# Patient Record
Sex: FEMALE | Race: WHITE
[De-identification: names, ages, dates, MRNs, and addresses within clinical notes are randomized per-mention and may not be internally consistent; named-entity substitution may affect disease eponyms.]

---

## 2019-08-01 ENCOUNTER — HOSPITAL ENCOUNTER (OUTPATIENT)
Dept: HOSPITAL 47 - SLEEP | Age: 64
End: 2019-08-01
Attending: INTERNAL MEDICINE
Payer: MEDICARE

## 2019-08-01 DIAGNOSIS — Z79.899: ICD-10-CM

## 2019-08-01 DIAGNOSIS — E11.9: ICD-10-CM

## 2019-08-01 DIAGNOSIS — E66.9: ICD-10-CM

## 2019-08-01 DIAGNOSIS — M19.90: ICD-10-CM

## 2019-08-01 DIAGNOSIS — K75.4: ICD-10-CM

## 2019-08-01 DIAGNOSIS — G47.33: Primary | ICD-10-CM

## 2019-08-01 DIAGNOSIS — I10: ICD-10-CM

## 2019-08-01 DIAGNOSIS — J45.909: ICD-10-CM

## 2019-08-01 DIAGNOSIS — E03.9: ICD-10-CM

## 2019-08-01 PROCEDURE — 99211 OFF/OP EST MAY X REQ PHY/QHP: CPT

## 2019-08-01 NOTE — CONS
CONSULTATION



DATE OF SERVICE:

08/01/2019



This patient is a 64-year-old lady who has been evaluated in the sleep center for

obstructive sleep apnea-hypopnea syndrome.



HISTORY OF PRESENT ILLNESS/SLEEP-WAKE EVALUATION:

The patient was diagnosed with sleep apnea about 18 years ago, was on treatment with

CPAP but had some problem with her CPAP unit and has not been able to use it recently.



Her usual sleep schedule is from around 12 midnight until between 4:30 and 6:30 a.m.

She does have problems with falling asleep, although no TV in bedroom.  She sleeps on

the side position. She snores, has episodes of stopped breathing during sleep, wakes up

from sleep several times with up to 2 episodes of nocturia.  No history of hypnagogic

hallucinations, sleep paralysis or cataplexy.  [place] Sleepiness Scale is

significantly increased at 13.



PAST MEDICAL HISTORY:

1. Hypertension.

2. Asthma.

3. Diabetes.

4. Arthritis.

5. Autoimmune hepatitis.



MEDICATIONS:

1. Synthroid.

2. Glucophage.

3. Actos.

4. Neurontin.

5. Albuterol on p.r.n. basis.

6. Azathioprine.

7. Some other medications; patient does not remember the names.



SOCIAL HISTORY:

Negative for smoking or using alcohol.



FAMILY HISTORY:

Hypertension, hyperlipidemia, arthritis, asthma, bronchitis, sleep apnea, thyroid

problems, diabetes, liver problem, acid reflux.



REVIEW OF SYSTEMS:

Multiple awakenings from sleep, snoring, tiredness and sleepiness during the day.



PHYSICAL EXAMINATION:

GENERAL: A pleasant  lady without distress.

VITAL SIGNS: /98, HR 78, RR 16, height 5 feet 0 inches, weight 222.  Body mass

index 43.3. Temperature 98.4, oxygen saturation on room air 98%.

HEENT: PERRLA, EOMI. Evaluation of oropharynx showed tongue protrudes midline.

Extremely low position of soft palate. Mallampati IV. Some restriction of nasal

breathing bilaterally.

NECK: Supple. No JVD.  Thyroid is not palpable. Wide neck; 17-1/2 inches in

circumference.

LUNGS: Clear to percussion and to auscultation.  Good air exchange.  No wheezing or

rhonchi.

HEART: S1, S2 regular.  No murmurs, gallops or rubs.

ABDOMEN: Obese.

EXTREMITIES:  One plus bilateral ankle edema.

CNS: Awake, alert, and oriented X3.  Cranial nerves 2 to 7 intact.  There is no

fasciculation or atrophy. noted.  No focal deficits observed.



IMPRESSION:

1. Snoring, witnessed episodes of stopped breathing during sleep, waking from sleep

    with choking, extremely low position of soft palate, wide neck, sleepiness, [place]

    Sleepiness Scale 13, history of obstructive sleep apnea in the past; obstructive

    sleep apnea-hypopnea syndrome.

2. Obesity; body mass index 43.3.

3. Hypothyroidism.

4. Hypertension.

5. Asthma.

6. History of arthritis.

7. Diabetes mellitus.

8. History of autoimmune hepatitis, diagnosed in 2016.



PLAN:

1. Polysomnography for evaluation of patient's breathing during sleep.

2. CPAP/BiPAP titration if sleep study confirms obstructive sleep apnea-hypopnea

    syndrome.

3. Preferable position during sleep on the side.

4. No driving if patient feels any sleepiness.

5. I will see patient for follow up visit to explain results of testing and following

    plan.



Thank you very much for referring this patient for consultation.



Sincerely,







Luis Enrique Painter MD, PhD, FAASM

Diplomat of American Board of Medical Specialties

American Board of Internal Medicine

Medical Director of [facility]





MMODL / IJN: [id number] / Job#: [id number]

## 2019-08-02 ENCOUNTER — HOSPITAL ENCOUNTER (OUTPATIENT)
Dept: HOSPITAL 47 - RADMAMWWP | Age: 64
Discharge: HOME | End: 2019-08-02
Attending: INTERNAL MEDICINE
Payer: MEDICARE

## 2019-08-02 DIAGNOSIS — M85.80: ICD-10-CM

## 2019-08-02 DIAGNOSIS — Z12.31: Primary | ICD-10-CM

## 2019-08-02 PROCEDURE — 77067 SCR MAMMO BI INCL CAD: CPT

## 2019-08-02 PROCEDURE — 77080 DXA BONE DENSITY AXIAL: CPT

## 2019-08-02 PROCEDURE — 77063 BREAST TOMOSYNTHESIS BI: CPT

## 2019-08-02 NOTE — BD
EXAMINATION TYPE: Axial Bone Density

 

DATE OF EXAM: 2019

 

COMPARISON: 2013

 

CLINICAL HISTORY: M 80.0

 

Height:  60 IN

Weight:  220 LBS

 

FRAX RISK QUESTIONS:

Secondary Osteoporosis:

    2.  Hyperthyroidism: YES

  3.  Menopause before 45: YES PARTIAL HYST AGE 41

    5.  Chronic liver disease: PT HAS AUTOIMMUNE HEPATITIS

 

 

RISK FACTORS 

HISTORY OF: 

Active: MODERATE

Postmenopausal woman: PARTIAL HYST AGE 41

Lost more than 2 inches in height since high school: YES 3 "

 

MEDICATIONS: 

Thyroid Medications:  YES

Which medication: Levothyroxine

How Lon+ YEARS

Additional Medications: CALCIUM, VIT D, LEVOTHYROXINE, CHOLESTEROL MEDS, DIABETES MEDS, BLOOD PRESSUR
E MEDS, LIVER MEDS  

 

 

 

EXAM MEASUREMENTS: 

Bone mineral densitometry was performed using the Bi[employer]'Up System.

Bone mineral density as measured about the Lumbar spine is:  

----- L1-L4(G/cm2): 1.393

T Score Values are as follows:

----- L2: 2.4

----- L3: 1.6

----- L4: 2.1

----- L1-L4: 1.8

Bone mineral density has: Increased 6.3% since study of: 2013

 

Bone mineral density about the R hip (g/cm2): 0.899

Bone mineral density about the L hip (g/cm2): 0.840

T Score values are as follows:

-----R Neck: -1.0

-----L Neck: -1.4

-----R Total: -0.8

-----L Total: 0.0

Bone mineral density has: Decreased -4.6% since study of: 2013

 

 

 

IMPRESSION:

Osteopenia (T Score between -2.5 and -1).

 

There is slightly increased risk of fracture and the patient may be considered 

for treatment. 

 

Re-Screen 2-5 years.

 

 

 

 

 

NOTE:  T-SCORE=SD OF THE YOUNG ADULT MEAN.

## 2019-08-05 NOTE — MM
Reason for exam: screening  (asymptomatic).

Last mammogram was performed 3 years and 2 months ago.



History:

Patient is postmenopausal.

Benign core biopsy of the left breast, 2009.



Physical Findings:

A clinical breast exam by your physician is recommended on an annual basis and 

results should be correlated with mammographic findings.



MG 3D Screening Mammo W/Cad

Bilateral CC and MLO view(s) were taken.

Prior study comparison: June 14, 2016, bilateral MG screening mammo w CAD.  July 7, 2014, left breast MG work up mamm w CAD LT.

The breast tissue is heterogeneously dense. This may lower the sensitivity of 

mammography.  Benign appearing bilateral calcifications. No suspicious 

abnormality. Skin defect on left in upper outer quadrant is chronic, possibly post

biopsy change.  No significant changes when compared with prior studies.





ASSESSMENT: Benign, BI-RAD 2



RECOMMENDATION:

Routine screening mammogram of both breasts in 1 year.

## 2019-10-01 ENCOUNTER — HOSPITAL ENCOUNTER (OUTPATIENT)
Dept: HOSPITAL 47 - LABWHC1 | Age: 64
Discharge: HOME | End: 2019-10-01
Attending: INTERNAL MEDICINE
Payer: MEDICARE

## 2019-10-01 DIAGNOSIS — K75.4: Primary | ICD-10-CM

## 2019-10-01 DIAGNOSIS — E11.65: ICD-10-CM

## 2019-10-01 DIAGNOSIS — E03.9: ICD-10-CM

## 2019-10-01 DIAGNOSIS — E11.9: ICD-10-CM

## 2019-10-01 LAB
ALBUMIN SERPL-MCNC: 4.5 G/DL (ref 3.8–4.9)
ALBUMIN/GLOB SERPL: 2.25 G/DL (ref 1.6–3.17)
ALP SERPL-CCNC: 55 U/L (ref 41–126)
ALT SERPL-CCNC: 30 U/L (ref 8–44)
ANION GAP SERPL CALC-SCNC: 12.2 MMOL/L (ref 4–12)
AST SERPL-CCNC: 39 U/L (ref 13–35)
BASOPHILS # BLD AUTO: 0 K/UL (ref 0–0.2)
BASOPHILS NFR BLD AUTO: 0 %
BUN SERPL-SCNC: 19 MG/DL (ref 9–27)
BUN/CREAT SERPL: 23.75 RATIO (ref 12–20)
CALCIUM SPEC-MCNC: 9.7 MG/DL (ref 8.7–10.3)
CHLORIDE SERPL-SCNC: 107 MMOL/L (ref 96–109)
CHOLEST SERPL-MCNC: 183 MG/DL (ref 0–200)
CO2 SERPL-SCNC: 25.8 MMOL/L (ref 21.6–31.8)
EOSINOPHIL # BLD AUTO: 0.2 K/UL (ref 0–0.7)
EOSINOPHIL NFR BLD AUTO: 3 %
ERYTHROCYTE [DISTWIDTH] IN BLOOD BY AUTOMATED COUNT: 4.26 M/UL (ref 3.8–5.4)
ERYTHROCYTE [DISTWIDTH] IN BLOOD: 12.9 % (ref 11.5–15.5)
GLOBULIN SER CALC-MCNC: 2 G/DL (ref 1.6–3.3)
GLUCOSE SERPL-MCNC: 111 MG/DL (ref 70–110)
HBA1C MFR BLD: 6.8 % (ref 4–6)
HCT VFR BLD AUTO: 37.1 % (ref 34–46)
HDLC SERPL-MCNC: 40 MG/DL (ref 40–60)
HGB BLD-MCNC: 12.4 GM/DL (ref 11.4–16)
LDLC SERPL CALC-MCNC: 109.4 MG/DL (ref 0–131)
LYMPHOCYTES # SPEC AUTO: 1.4 K/UL (ref 1–4.8)
LYMPHOCYTES NFR SPEC AUTO: 23 %
MCH RBC QN AUTO: 29.1 PG (ref 25–35)
MCHC RBC AUTO-ENTMCNC: 33.4 G/DL (ref 31–37)
MCV RBC AUTO: 87.1 FL (ref 80–100)
MONOCYTES # BLD AUTO: 0.3 K/UL (ref 0–1)
MONOCYTES NFR BLD AUTO: 5 %
NEUTROPHILS # BLD AUTO: 4 K/UL (ref 1.3–7.7)
NEUTROPHILS NFR BLD AUTO: 67 %
PLATELET # BLD AUTO: 330 K/UL (ref 150–450)
POTASSIUM SERPL-SCNC: 4.4 MMOL/L (ref 3.5–5.5)
PROT SERPL-MCNC: 6.5 G/DL (ref 6.2–8.2)
SODIUM SERPL-SCNC: 145 MMOL/L (ref 135–145)
T4 FREE SERPL-MCNC: 1.8 NG/DL (ref 0.8–1.8)
TRIGL SERPL-MCNC: 168 MG/DL (ref 0–149)
VLDLC SERPL CALC-MCNC: 33.6 MG/DL (ref 5–40)
WBC # BLD AUTO: 6.1 K/UL (ref 3.8–10.6)

## 2019-10-01 PROCEDURE — 83036 HEMOGLOBIN GLYCOSYLATED A1C: CPT

## 2019-10-01 PROCEDURE — 84681 ASSAY OF C-PEPTIDE: CPT

## 2019-10-01 PROCEDURE — 85025 COMPLETE CBC W/AUTO DIFF WBC: CPT

## 2019-10-01 PROCEDURE — 84439 ASSAY OF FREE THYROXINE: CPT

## 2019-10-01 PROCEDURE — 82570 ASSAY OF URINE CREATININE: CPT

## 2019-10-01 PROCEDURE — 80053 COMPREHEN METABOLIC PANEL: CPT

## 2019-10-01 PROCEDURE — 80061 LIPID PANEL: CPT

## 2019-10-01 PROCEDURE — 84443 ASSAY THYROID STIM HORMONE: CPT

## 2019-10-01 PROCEDURE — 82043 UR ALBUMIN QUANTITATIVE: CPT

## 2019-10-01 PROCEDURE — 36415 COLL VENOUS BLD VENIPUNCTURE: CPT

## 2019-12-26 ENCOUNTER — HOSPITAL ENCOUNTER (OUTPATIENT)
Dept: HOSPITAL 47 - SLEEP | Age: 64
Discharge: HOME | End: 2019-12-26
Attending: INTERNAL MEDICINE
Payer: MEDICARE

## 2019-12-26 DIAGNOSIS — G47.33: Primary | ICD-10-CM

## 2019-12-26 DIAGNOSIS — E11.9: ICD-10-CM

## 2019-12-26 DIAGNOSIS — J45.909: ICD-10-CM

## 2019-12-26 DIAGNOSIS — Z99.89: ICD-10-CM

## 2019-12-26 DIAGNOSIS — E66.9: ICD-10-CM

## 2019-12-26 DIAGNOSIS — Z79.84: ICD-10-CM

## 2019-12-26 DIAGNOSIS — Z87.19: ICD-10-CM

## 2019-12-26 DIAGNOSIS — E03.9: ICD-10-CM

## 2019-12-26 DIAGNOSIS — I10: ICD-10-CM

## 2019-12-26 DIAGNOSIS — Z87.39: ICD-10-CM

## 2019-12-26 DIAGNOSIS — Z79.890: ICD-10-CM

## 2019-12-26 DIAGNOSIS — Z79.899: ICD-10-CM

## 2019-12-26 NOTE — SLS
SLEEP STUDY



DATE OF SERVICE:

12/26/2019



A 64-year-old lady who has been followed in the Sleep Center for treatment of

obstructive sleep apnea-hypopnea syndrome.  Recently patient had a polysomnogram which

showed severe sleep apnea with apnea-hypopnea index 54. CPAP titration when her

respiration was normalized.  Subsequently, she received new CPAP unit, and today is the

first visit after she received new CPAP.  She is able to use it every night.  Sometimes

she feels that her nasal mask slightly small for her nose; otherwise no significant

problems.  [place]etia Sleepiness Scale today is 2.



I checked CPAP unit, range of the pressure 5-15 with average pressure 12.7.  Usage is

100% of nights more than 4 hours with average usage 8 hours per night. Leak is 22

L/minute.  Latency is slightly high for the nasal mask. Apnea-hypopnea index was 1.9.

Sometimes patient feels that shoe opened her mouth during sleep.  She may have

drooling.



MEDICATIONS:

Levothyroxine, Glucophage, Actos, Neurontin, albuterol, _____, glimepiride,

hydrochlorothiazide, atenolol, meclizine, Singulair, Micardis, Nexium, Zetia,

atorvastatin, amitriptyline.



PHYSICAL EXAM:

Patient in no distress, /82, HR 90, RR 18, weight 219.6, temperature 98.4, oxygen

saturation at room air 96%.

OROPHARYNX:  Extremely low position of soft palate, Mallampati IV.

ABDOMEN:  Obese.

Patient walks with a walker.

NECK:  Supple, no JVD.  Thyroid is not palpable.

LUNGS:  Clear to percussion and to auscultation.  Good air exchange.  No wheezing or

rhonchi.

HEART:  S1, S2 regular.  No murmurs, gallops, or rubs.

CNS:  Awake, alert, and oriented X3.  Cranial nerves 2 to 7 intact.  There is no

fasciculation or atrophy noted.  No focal deficits observed.



IMPRESSION:

1. Obstructive sleep apnea-hypopnea syndrome; apnea-hypopnea index 54.  Patient

    demonstrated 100% compliance with treatment, benefitting from treatment.

2. Patient has episodes of drooling, but indicated the reason for leak, possibly

    patient opening her mouth during the sleep.

3. Obesity.

4. Hypothyroidism.

5. Hypertension.

6. Asthma.

7. History of arthritis.

8. Diabetes mellitus.

9. History of autoimmune hepatitis diagnosed in 2016.



PLAN:

1. Prescription for chin strap.

2. Change size of the nasal mask to medium.

3. Patient will continue to use CPAP equipment every night for the whole night with

    same regimen of pressure.

4. Losing weight.

5. No driving if feeling sleepiness.

Thank you very much for allowing me to participate in the management of your patient.



Sincerely,



Luis Enrique Painter MD, PhD, FAASM

Diplomat of American Board of Medical Specialties

American Board of Internal Medicine

Medical Director of [facility]





MMODL / IJN: [id number] / Job#: [id number]

## 2020-06-27 ENCOUNTER — HOSPITAL ENCOUNTER (OUTPATIENT)
Dept: HOSPITAL 47 - RADMRIMAIN | Age: 65
Discharge: HOME | End: 2020-06-27
Attending: PSYCHIATRY & NEUROLOGY
Payer: MEDICARE

## 2020-06-27 DIAGNOSIS — R90.89: ICD-10-CM

## 2020-06-27 DIAGNOSIS — G31.9: Primary | ICD-10-CM

## 2020-06-27 DIAGNOSIS — C71.9: ICD-10-CM

## 2020-06-27 PROCEDURE — 70553 MRI BRAIN STEM W/O & W/DYE: CPT

## 2020-06-28 NOTE — MR
EXAMINATION TYPE: MR brain wo/w con

 

DATE OF EXAM: 6/27/2020

 

COMPARISON: 9/11/2012

 

HISTORY: Malignant neoplasm of the brain

 

CONTRAST: 

Standard multiplanar, multisequence MRI departmental protocol utilizing 10 mL intravenous Gadavist ga
dolinium contrast.  

 

There is mild cerebral cortical atrophy. There is no mass effect nor midline shift. There is slight t
hinning of the corpus callosum. Diffusion images show no evidence of acute infarct. The brainstem is 
intact. There are a few scattered small white matter high signal foci in both cerebral hemispheres on
 the FLAIR images that measure up to 4 mm. Total number is less than 10. The sella turcica appears no
rmal. Optic chiasm appears normal. There is no evidence of orbital mass.

 

The contrast images show no pathologic enhancement. There is normal opacification of the venous sinus
es. There is no evidence of intracranial hemorrhage.

 

IMPRESSION:

Mild cerebral atrophy with scattered small white matter high signal foci probably due to some minimal
 chronic small vessel ischemia. This appears not significantly different than old exam. No evidence o
f cortical infarct. No evidence of brain tumor.

## 2020-10-13 ENCOUNTER — HOSPITAL ENCOUNTER (OUTPATIENT)
Dept: HOSPITAL 47 - RADMAMWWP | Age: 65
Discharge: HOME | End: 2020-10-13
Attending: INTERNAL MEDICINE
Payer: MEDICARE

## 2020-10-13 DIAGNOSIS — Z12.31: Primary | ICD-10-CM

## 2020-10-13 PROCEDURE — 77067 SCR MAMMO BI INCL CAD: CPT

## 2020-10-13 PROCEDURE — 77063 BREAST TOMOSYNTHESIS BI: CPT

## 2020-10-14 NOTE — MM
Reason for exam: screening  (asymptomatic).

Last mammogram was performed 1 year and 2 months ago.



History:

Patient is postmenopausal.

Benign core biopsy of the left breast, 2009.



Physical Findings:

A clinical breast exam by your physician is recommended on an annual basis and 

results should be correlated with mammographic findings.



MG 3D Screening Mammo W/Cad

Bilateral CC and MLO view(s) were taken.

Prior study comparison: August 2, 2019, bilateral MG 3d screening mammo w/cad.  

June 14, 2016, bilateral MG screening mammo w CAD.

There are scattered fibroglandular densities.  There is no discrete abnormality.  

No significant changes when compared with prior studies.





ASSESSMENT: Negative, BI-RAD 1



RECOMMENDATION:

Routine screening mammogram of both breasts in 1 year.

## 2021-08-03 ENCOUNTER — HOSPITAL ENCOUNTER (OUTPATIENT)
Dept: HOSPITAL 47 - LABWHC1 | Age: 66
Discharge: HOME | End: 2021-08-03
Attending: NURSE PRACTITIONER
Payer: MEDICARE

## 2021-08-03 ENCOUNTER — HOSPITAL ENCOUNTER (OUTPATIENT)
Dept: HOSPITAL 47 - LABPAT | Age: 66
Discharge: HOME | End: 2021-08-03
Attending: ORTHOPAEDIC SURGERY
Payer: MEDICARE

## 2021-08-03 DIAGNOSIS — Z53.9: Primary | ICD-10-CM

## 2021-08-03 DIAGNOSIS — K75.4: Primary | ICD-10-CM

## 2021-08-03 DIAGNOSIS — E11.40: ICD-10-CM

## 2021-08-03 LAB
ALBUMIN SERPL-MCNC: 4.9 G/DL (ref 3.8–4.9)
ALBUMIN/GLOB SERPL: 1.69 G/DL (ref 1.6–3.17)
ALP SERPL-CCNC: 114 U/L (ref 41–126)
ALT SERPL-CCNC: 28 U/L (ref 8–44)
ANION GAP SERPL CALC-SCNC: 12.7 MMOL/L (ref 4–12)
APTT BLD: 22.6 SEC (ref 22–30)
AST SERPL-CCNC: 31 U/L (ref 13–35)
BASOPHILS # BLD AUTO: 0.08 X 10*3/UL (ref 0–0.1)
BASOPHILS NFR BLD AUTO: 0.9 %
BUN SERPL-SCNC: 24 MG/DL (ref 9–27)
BUN/CREAT SERPL: 20 RATIO (ref 12–20)
CALCIUM SPEC-MCNC: 10 MG/DL (ref 8.7–10.3)
CHLORIDE SERPL-SCNC: 102 MMOL/L (ref 96–109)
CO2 SERPL-SCNC: 21.3 MMOL/L (ref 21.6–31.8)
EOSINOPHIL # BLD AUTO: 0.14 X 10*3/UL (ref 0.04–0.35)
EOSINOPHIL NFR BLD AUTO: 1.6 %
ERYTHROCYTE [DISTWIDTH] IN BLOOD BY AUTOMATED COUNT: 4.58 X 10*6/UL (ref 4.1–5.2)
ERYTHROCYTE [DISTWIDTH] IN BLOOD: 13.6 % (ref 11.5–14.5)
GLOBULIN SER CALC-MCNC: 2.9 G/DL (ref 1.6–3.3)
GLUCOSE SERPL-MCNC: 405 MG/DL (ref 70–110)
HCT VFR BLD AUTO: 41.6 % (ref 37.2–46.3)
HGB BLD-MCNC: 13.3 G/DL (ref 12–15)
INR PPP: 0.9 (ref ?–1.2)
LYMPHOCYTES # SPEC AUTO: 1.72 X 10*3/UL (ref 0.9–5)
LYMPHOCYTES NFR SPEC AUTO: 19.5 %
MCH RBC QN AUTO: 29 PG (ref 27–32)
MCHC RBC AUTO-ENTMCNC: 32 G/DL (ref 32–37)
MCV RBC AUTO: 90.8 FL (ref 80–97)
MONOCYTES # BLD AUTO: 0.58 X 10*3/UL (ref 0.2–1)
MONOCYTES NFR BLD AUTO: 6.6 %
NEUTROPHILS # BLD AUTO: 6.17 X 10*3/UL (ref 1.8–7.7)
NEUTROPHILS NFR BLD AUTO: 70 %
PLATELET # BLD AUTO: 443 X 10*3/UL (ref 140–440)
POTASSIUM SERPL-SCNC: 4.9 MMOL/L (ref 3.5–5.5)
PROT SERPL-MCNC: 7.8 G/DL (ref 6.2–8.2)
PT BLD: 9.7 SEC (ref 9–12)
SODIUM SERPL-SCNC: 136 MMOL/L (ref 135–145)
WBC # BLD AUTO: 8.81 X 10*3/UL (ref 4.5–10)

## 2021-08-03 PROCEDURE — 85610 PROTHROMBIN TIME: CPT

## 2021-08-03 PROCEDURE — 85730 THROMBOPLASTIN TIME PARTIAL: CPT

## 2021-08-03 PROCEDURE — 85025 COMPLETE CBC W/AUTO DIFF WBC: CPT

## 2021-08-03 PROCEDURE — 36415 COLL VENOUS BLD VENIPUNCTURE: CPT

## 2021-08-03 PROCEDURE — 80053 COMPREHEN METABOLIC PANEL: CPT

## 2021-08-04 ENCOUNTER — HOSPITAL ENCOUNTER (OUTPATIENT)
Dept: HOSPITAL 47 - OR | Age: 66
Discharge: HOME | End: 2021-08-04
Attending: ORTHOPAEDIC SURGERY
Payer: MEDICARE

## 2021-08-04 VITALS — BODY MASS INDEX: 43.4 KG/M2

## 2021-08-04 VITALS — RESPIRATION RATE: 16 BRPM

## 2021-08-04 VITALS — SYSTOLIC BLOOD PRESSURE: 149 MMHG | HEART RATE: 85 BPM | DIASTOLIC BLOOD PRESSURE: 79 MMHG

## 2021-08-04 VITALS — TEMPERATURE: 98.6 F

## 2021-08-04 DIAGNOSIS — K21.9: ICD-10-CM

## 2021-08-04 DIAGNOSIS — I48.91: ICD-10-CM

## 2021-08-04 DIAGNOSIS — S52.561A: Primary | ICD-10-CM

## 2021-08-04 DIAGNOSIS — K75.4: ICD-10-CM

## 2021-08-04 DIAGNOSIS — E11.9: ICD-10-CM

## 2021-08-04 DIAGNOSIS — Z79.82: ICD-10-CM

## 2021-08-04 DIAGNOSIS — E07.9: ICD-10-CM

## 2021-08-04 DIAGNOSIS — G47.33: ICD-10-CM

## 2021-08-04 DIAGNOSIS — Z86.73: ICD-10-CM

## 2021-08-04 DIAGNOSIS — E78.5: ICD-10-CM

## 2021-08-04 DIAGNOSIS — Z88.2: ICD-10-CM
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
## 2021-08-04 DIAGNOSIS — Z88.1: ICD-10-CM

## 2021-08-04 DIAGNOSIS — I10: ICD-10-CM

## 2021-08-04 DIAGNOSIS — J45.909: ICD-10-CM

## 2021-08-04 DIAGNOSIS — Z79.4: ICD-10-CM

## 2021-08-04 LAB
GLUCOSE BLD-MCNC: 220 MG/DL (ref 75–99)
GLUCOSE BLD-MCNC: 262 MG/DL (ref 75–99)
GLUCOSE BLD-MCNC: 334 MG/DL (ref 75–99)

## 2021-08-04 PROCEDURE — 64415 NJX AA&/STRD BRCH PLXS IMG: CPT

## 2021-08-04 PROCEDURE — 25609 OPTX DST RD XART FX/EP SEP3+: CPT

## 2021-08-04 PROCEDURE — 76942 ECHO GUIDE FOR BIOPSY: CPT

## 2021-08-04 NOTE — P.HPOR
History of Present Illness


H&P Date: 21


Chief Complaint: R wrist pain





65 yo female presented to ED over the weekend for R wrist pain.  She sustained a

fall from standing onto her wrist at her [employer] [place].  She had immediate pain 

and inability to bear weight.  She c/o swelling in the wrist.  She presnted to 

the ED and images showed a comminuted intraarticular fracture of the right 

wrist. She presented  to the office then for follow up and surgical 

considerations.  She was deemed to have a surgical fracture upon this visit and 

was booked for surgery.  Today she was seen and evaluated in the pre op area.  

She is still having wrist pain.  She was evaluated by anesthesia who deemed her 

fit for surgery at this time.  We discussed risks and benefits of the procedure 

again and she was willing to assume these risks and all the risk of surgery.  

Site was marked and she was given a interscalene block by dept of anesthesia.  

Consent was confirmed before this.  Rt side is correct.  Abx were ordered and 

she is ready for procedure.  





Review of Systems





14 points review of systems completed and as stated in HPI, all other systems 

reviewed are negative. 





Past Medical History


Past Medical History: Atrial Fibrillation, Asthma, CVA/TIA, Diabetes Mellitus, 

GERD/Reflux, Hyperlipidemia, Hypertension, Liver Disease, Sleep 

Apnea/CPAP/BIPAP, Thyroid Disorder


Additional Past Medical History / Comment(s): Dizziness, TIA X3, last one 12-14 

yrs ago, joint pain, elevated liver enzymes, Autoimmune Hepatitis, CPAP use.


History of Any Multi-Drug Resistant Organisms: MRSA


Date of last positivie culture/infection: 08


MDRO Source:: Left breast


Past Surgical History: Hernia Repair, Hysterectomy


Additional Past Surgical History / Comment(s): Fatty tumor removed from lower 

left leg.


Past Anesthesia/Blood Transfusion Reactions: No Reported Reaction


Past Psychological History: Anxiety


Smoking Status: Never smoker


Past Alcohol Use History: None Reported


Past Drug Use History: None Reported





- Past Family History


  ** Mother


Family Medical History: No Reported History





Medications and Allergies


                                Home Medications











 Medication  Instructions  Recorded  Confirmed  Type


 


Albuterol Inhaler (Mhu) [Ventolin 1 - 2 puff INHALATION RT-Q6H PRN 16 History





Hfa Inhaler (Mhu)]    


 


Aspirin [Adult Low Dose Aspirin EC] 81 mg PO DAILY 16 History


 


Gabapentin [Neurontin] 100 mg PO TID 16 History


 


Insulin Glulisine [Apidra Solostar] 0 unit SQ-PUMP CONTINUOUS 16 

History


 


Levothyroxine Sodium [Synthroid] 200 mcg PO QAM 16 History


 


Meclizine [Antivert] 25 mg PO DAILY PRN 16 History


 


Losartan Potassium [Cozaar] 100 mg PO QAM 16 History


 


azaTHIOprine [Imuran] 50 mg PO DAILY 16 History


 


atenoloL [Tenormin] 25 mg PO HS #30 tab 16 Rx


 


Acetaminophen-Codeine 300-30mg 1 tab PO Q4H PRN 21 History





[Tylenol w/codeine #3]    


 


Glimepiride [Amaryl] 0.5 mg PO DAILY PRN 21 History


 


Insulin Glargine [Lantus] 15 unit SQ ONCE 21 History


 


metFORMIN HCL [Glucophage] 500 mg PO DAILY 21 History








                                    Allergies











Allergy/AdvReac Type Severity Reaction Status Date / Time


 


sulfamethoxazole AdvReac  Unknown Verified 21 11:10





[From Bactrim]     


 


trimethoprim [From Bactrim] AdvReac  Unknown Verified 21 11:09














Physical Examination


Osteopathic Statement: *.  No significant issues noted on an osteopathic 

structural exam other than those noted in the History and Physical/Consult.





Patient is alert and oriented 3 appears well-nourished well-hydrated is in no 

acute distress.  They do not appear septic. 





There is TTP of the right wrist in her splint.  She has some swelling about this

area and ecchymosis as well.





Lower extremities with 5 out of 5 strength in all major muscle groups





Upper extremities show 5/5 strength in all major muscle groups.  Except for the 

right wrist and hand which has swelling and pain secondary to fracture





There is FROM that is painless of the b/l UE and LE in all major joints.  


They are intact to light touch sensation in L2 to S1 nerve distribution.  As 

well as a C5 to T1 nerve distribution  


DTR 2/4 all upper and lower extremities


Patient has palpable dorsalis pedis was posterior tibial pulses.  


Palpable Rad Ulnar pulses b/l


Capillary refill is brisk and less than 2 seconds in all fingertips


Compartments are soft and compressible.  





Patient shows a negative Homans





Cranial nerves II through XII are grossly intact.  





Special Testing: 


Stable DRUJ joint








Results





X-rays of the right wrist demonstrate a right comminuted intra-articular 

fracture of the right distal radius with a large volar Resendez's fragment that is

displaced.  This is closed 3 part.  DRUJ is congruent.  The remainder 

visualization of the bony prominences demonstrates no other fracture dislocation

of this time.  Some radiocarpal arthritis noted.





- Labs


Labs: 


                  Abnormal Lab Results - Last 24 Hours (Table)











  21 Range/Units





  11:21 


 


POC Glucose (mg/dL)  334 H  (75-99)  mg/dL














Assessment and Plan


Assessment: 





66-year-old female status post fall from standing with right intra-articular 

comminuted distal radius fracture 3 part closed


Plan: 





                         Orthopedic Surgery Risk Review





                                         





Emily Tapia is a 66-year-old right-hand-dominant female presenting for 

evaluation of sudden onset wrist pain, inability to bear weight after fall from 

standing onto her right wrist.   It was my pleasure to have seen and examined 

Emily Tapia .  





In our visit today we have had a chance to go over subjective complaints, 

physical examination findings and treatments including the natural course 

history without intervention and various interventional options.  Her imaging 

demonstrates right distal radius comminuted intra-articular fracture with volar 

Resendez's displaced fragment. On physical exam, Emily Tapia  demonstrates pain 

with motion of pain with movement of the right wrist swelling and ecchymosis, 

which is NV intact at this time. 





I have explained to the patient that this fracture needs stabilization. Based on

the patients imaging, physical exam, and the rapid progression and disabling 

nature of her symptoms, at this time I recommend surgery in the form or a: Open 

reduction internal fixation right wrist I discussed the risk and benefits of 

this procedure at length with Emily Tapia .   Questions were invited and 

answered, and the patient wishes to proceed as outlined below.   





Currently, I am recommendin.  Open reduction and internal fixation of right wrist





2.    Review of surgical risks and benefits as well as an educational packet on 

the proposed surgical procedure. 





  





Risks:  





All surgical procedures come with inherent risks, including those related to 

positioning, anesthesia, intraoperative findings, and postoperative 

complications.  It is important to understand that surgery does not come with 

any guarantee of a successful outcome as complications and adverse events are 

always possible.    





The patient was given a handout discussing the surgical procedure and risks 

associated with the intervention, both of which were discussed with the patient.

 These risks include but are not limited to the following: 





-      Experiencing same, different or even worse symptoms compared to before 

surgery. 





-      Requiring further surgery or other forms of treatment presently or at 

some time in the future . 





-      On an extreme but fortunately relatively rare basis severe complication 

such as blindness, stroke, heart attack, temporary and/or permanent nerve 

injury, paralysis, coma, or death may occur, sometimes without known 

explanation. 





-      Surgical complications may include but are not limited to risk of 

infection, fluid accumulation in the surgical dissection site, including a 

seroma or hematoma, that requires additional surgery, wound drainage, bleeding, 

new numbness or weakness, vision changes/loss, spinal fluid leakage, non-healing

and/or infected incision, headaches, difficulty or inability to swallow, 

hoarseness, hemopneumothorax, pneumothorax,  injury to nerves, spinal cord, 

blood vessels, lymphatics or other vital organs (i.e., bowel injury, injury to 

the great vessels); heterotopic bone formation; complications related to the 

hardware such as screws, rods,  including misplaced hardware, device failure, 

hardware fracture/breakage, or hardware loosening;  retained surgical 

instrumentations or devices and the need for further surgery.     





-      Medical risks of the planned surgery include but are not limited to 

generalized Infections to the whole body or local areas outside of the surgical 

site (sepsis), heart attack, bleeding, anaphylaxis, meningitis, seizure, 

epilepsy, hearing loss, burn marks, laceration of the head or other areas of the

body, bruising, hypersensitivity of the skin, bladder over distension; allergic 

reaction; shoulder injury related to positioning; fat, blood and air clots to 

other areas of the body like heart, lungs, brain; failure of internal organs 

such as lungs, kidneys, liver and excessive bleeding. If blood transfusions are 

necessary, note that transfusions may cause intolerance reactions such as 

anaphylaxis or other complex reactions. 





  Despite best efforts, the results of surgery might not heal in terms of bone, 

soft tissues such as skin, fascia, ligaments, and joints.   





  [employer] is an educational center that serves as a training facility

for physician assistants, nurses, orthopedic residents and fellows.  Residents 

are physicians who are completing their surgical intensive training following 

medical school.  They assist in the operating room with direct supervision of 

the attending surgeons.  [place] are surgeons who have completed their training 

and eligible for board certification.  They have opted for an elective year of 

more specialized training in their field.  They assist in the operating room 

under the supervision of the attending surgeons.  Physician assistants are 

medically trained surgical providers who function in the outpatient, inpatient, 

and operating room setting under the direct supervision of the attending 

surgeon.   





[facility] has multiple operating rooms with single and overlapping 

rooms running daily.  They currently function under the required guidelines as 

produced by the [facility] Finance Committee with regards to the overlapping rooms 

and will continue to comply with changes to this policy as they occur.  The 

requirements include and are complied with as follows: (1) the critical portions

of the overlapping rooms will not occur at the same time, (2) the attending 

physician will be physically present during the critical portions of the 

procedure and immediately available during the entire case, and (3) a back-up 

attending is designated should the primary attending not be immediately 

available. 





The patient has had a chance to review all the listed information, has been 

given print outs detailing this information, and has had all his/her questions 

answered to their satisfaction. 





It was my pleasure to have seen and examined Emily Tapia . In our visit today 

we have had a chance to go over my understanding of our patient's current 

condition, the natural course history without intervention and various 

interventional options. Questions were invited and answered, and the patient 

wishes to proceed as outlined above. I have seen and examined the patient for 25

minutes and we have spent more than 50% of the time in repeat and detailed 

counseling about the patient's condition, its natural course history with out 

and as much as can be predicted with surgery and re-review of various surgical 

treatment options. 





  





In conclusion, Emily Tapia  requested we proceed with the above suggested 

surgery and are willing to accept risks and limitations of the suggested surgery

as nature of the disease process and our best attempts at treatment for the 

condition. 





Thank you again for allowing us to be part of your patient's care. Please don't 

hesitate to contact me if you have any further questions.    





  





Signed and authenticated by:       





  





Abhishek Jim DO 





[facility] 





Complex and Minimally Invasive Spine Surgery 





[address], [address] 





[place] [address] 





[phone]  





Fax: [phone]

## 2021-08-04 NOTE — P.ANPRN
Procedure Note - Anesthesia





- Nerve Block Performed


  ** Right Supraclavicular Single


Time Out Performed: Yes


Date of Procedure: 08/04/21


Procedure Start Time: 12:04


Procedure Stop Time: 12:09


Location of Patient: PreOp


Indication: Acute Post-Operative Pain, Requested by Surgeon


Sedation Type: Sedate with meaningful contact maintained


Preparation: Sterile Prep


Position: Supine


Needle Types: Pajunk


Needle Gauge: 21


Ultrasound used to visualize needle placement: Yes


Ultrasound used to observe medication spread: Yes


Blood Aspirated: No


Pain Paresthesia on Injection Noted: No


Resistance on Injection: Normal


Image Stored and Saved: Yes


Events: Uneventful and Well Tolerated (ropi .5% 20cc)

## 2021-08-05 NOTE — OP
OPERATIVE REPORT



DATE OF SERVICE:

08/04/2021.



PREOPERATIVE DIAGNOSIS:

Right distal radius fracture, intra-articular, three-part with volar Resendez's 
fragment.



POSTOPERATIVE DIAGNOSIS:

Right distal radius fracture, intra-articular, three-part with volar Resendez's 
fragment.



PROCEDURES PERFORMED:

ORIF, right distal radius.



IMPLANTS:

Arthrex distal radius locking plate narrow.



ANESTHESIA:

GETA with regional block.



SURGEON:

Abhishek Jim DO.



ASSISTANT:

Primo OLVERA, who was present for the entire case and necessary due to the 
complexity

of the case.



ESTIMATED BLOOD LOSS:

20 mL



IV FLUIDS:

1000.



URINE OUTPUT:

None.



PATHOLOGY:

None sent.



CONDITION:

Stable.



DISPOSITION:

To PACU.



INDICATIONS FOR PROCEDURE:

This is a 66-year-old right-hand dominant female who sustained a fall from 
standing at

her Religion event.  She complained of pain in her right wrist and she went to the

emergency department.  She was found to have a right distal radius fracture that
was

displaced.  They attempted reduction, placed in a splint and she was seen in the

office.  Upon followup in office, she has continued to have pain in her wrist 
and

inability to bear weight with it.  X-rays demonstrated a right distal radius 
fracture

that was intra-articular comminuted with a volar Resendez's fragment that was 
displaced

and shortened.  We discussed with the patient conservative versus surgical 
options and

she elected for surgical treatment of this unstable fracture, which is 
reasonable.



The patient was seen in the preoperative area. All preop protocols followed.  
The

patient was seen by the department of anesthesia and deemed fit for surgery.  
The site

was marked.  She was given an interscalene block by the department of 
anesthesia.  She

was given a weight based dose of antibiotics.  We confirmed the consent form and
she

was ready to proceed with procedures.  We discussed risks and benefits again as

outlined in the risk report.  She is willing to assume these risks and all the 
risks of

surgery.



PROCEDURE:

The patient was transferred to the operative suite, placed supine by the Dept of

Anesthesia and placed supine on the operating table.  An armboard was placed on 
the

right upper extremity.  All bony prominences were padded accordingly.  SCDs were
placed

on the patient's lower extremities.  The right arm was exposed.  A tourniquet 
was

placed on the patient's right upper arm and well-padded.  1015 was placed around
this.

Briefing was performed and all parties ready for the surgery.  The patient's 
right arm

was then prepped and draped in normal sterile fashion.  Time-out was performed 
and

parties in agreement with procedure to be performed.  A standard volar Eriberto 
approach

to the distal radius was taken.  We made a skin incision over the FCR and blunt

dissected down to the sheath of the FCR, which was then incised sharply with a 
knife.

We then mobilized the FCR medially.  We were then able to incise the dorsal part
of the

sheath and dissected bluntly down to the pronator.  The pronator was extremely

hypertrophied in this area and was very erythematous and hematoma was about this
area

secondary to fracture.  The pronator was released in a L type fashion from the 
radial

styloid on the radial aspect and across transversely distally.  Once we had

visualization of the fracture as well as the radial shaft, the fracture was 
reduced

using a combination of manual traction, ligamental Taxus and a [place] elevator.  
Once it

was in a good position,  mini C-arm was used to confirm fracture reduction.  The
volar

fragment was extremely unstable and so in order to reduce this, we aided with a 
plate

reduction for buttressing of this fragment.  The plate was selected and it was 
sized

and put into place.  Once it was in place, we were able to pin the plate.  We 
then

reduced the volar Resendez's fragment once again and held it reduced with the 
plate and a

shaft screw.  We placed 1 shaft screw in the sliding hole, which allowed us to 
slide

the plate distally to a good position as well as to reduce and hold the fracture
in a

buttressing type motion.  We then drilled the distal row of the plate with 
locking

smooth pegs.  These were all measured and then placed accordingly under AP and 
lateral

fluoroscopy.  We then drilled the proximal row on the ulnar side and this was 
able to

be placed, however, we could place the radial side as this was within the 
fracture

fragment.  We then placed 2 more shaft screws and these had good purchase.  We 
then

took AP and lateral fluoroscopy.  The plate was in good position.  The fracture 
was

reduced.  We ranged the wrist and the fracture was stable at this time.  We then

copiously irrigated the wound with normal sterile saline.  We did place 1 mL of 
DBM

into the fracture area due to some bone void in the area from comminution.  This
was

impacted into place and was extra-articular.  We then proceeded with closure.  
2-0

Vicryl was used in the subcu tissue, followed by a running 2-0 nylon in the 
skin.

Wound edges approximated very well.  The wound was then cleaned and dressed 
sterilely

with Adaptic, 4 x 4, ABD, and was overwrapped with a Webril.  The patient was 
then

placed in a well-padded well-molded volar dorsal splint of the right upper 
extremity.

This was overwrapped with an Ace wrap.  Tourniquet was deflated before this at 
42

minutes.  We were able to cauterize any bleeders with electrocautery.  The 
patient was

then transferred back to her hospital bed.  She was awakened by the Department 
of

Anesthesia, having tolerated the procedure very well with no complications.



The patient was transferred to the PACU in a stable condition.





NICOL / TSERING: [id number] / Job#: [id number]

[id number]